# Patient Record
Sex: FEMALE | Race: BLACK OR AFRICAN AMERICAN | NOT HISPANIC OR LATINO | URBAN - METROPOLITAN AREA
[De-identification: names, ages, dates, MRNs, and addresses within clinical notes are randomized per-mention and may not be internally consistent; named-entity substitution may affect disease eponyms.]

---

## 2024-04-01 ENCOUNTER — HOSPITAL ENCOUNTER (EMERGENCY)
Facility: HOSPITAL | Age: 29
Discharge: HOME | End: 2024-04-01
Attending: EMERGENCY MEDICINE

## 2024-04-01 VITALS
SYSTOLIC BLOOD PRESSURE: 128 MMHG | HEIGHT: 62 IN | DIASTOLIC BLOOD PRESSURE: 75 MMHG | WEIGHT: 139 LBS | TEMPERATURE: 99.6 F | HEART RATE: 74 BPM | RESPIRATION RATE: 16 BRPM | BODY MASS INDEX: 25.58 KG/M2 | OXYGEN SATURATION: 99 %

## 2024-04-01 DIAGNOSIS — R10.2 PELVIC PAIN: Primary | ICD-10-CM

## 2024-04-01 DIAGNOSIS — N89.8 VAGINAL DISCHARGE: ICD-10-CM

## 2024-04-01 LAB
APPEARANCE UR: CLEAR
BACTERIA #/AREA URNS AUTO: ABNORMAL /HPF
BILIRUB UR STRIP.AUTO-MCNC: NEGATIVE MG/DL
CLUE CELLS SPEC QL WET PREP: NORMAL
COLOR UR: YELLOW
GLUCOSE UR STRIP.AUTO-MCNC: NORMAL MG/DL
KETONES UR STRIP.AUTO-MCNC: ABNORMAL MG/DL
LEUKOCYTE ESTERASE UR QL STRIP.AUTO: NEGATIVE
MUCOUS THREADS #/AREA URNS AUTO: ABNORMAL /LPF
NITRITE UR QL STRIP.AUTO: NEGATIVE
PH UR STRIP.AUTO: 5.5 [PH]
PREGNANCY TEST URINE, POC: NEGATIVE
PROT UR STRIP.AUTO-MCNC: ABNORMAL MG/DL
RBC # UR STRIP.AUTO: NEGATIVE /UL
RBC #/AREA URNS AUTO: ABNORMAL /HPF
SP GR UR STRIP.AUTO: 1.03
SQUAMOUS #/AREA URNS AUTO: ABNORMAL /HPF
T VAGINALIS SPEC QL WET PREP: NORMAL
UROBILINOGEN UR STRIP.AUTO-MCNC: NORMAL MG/DL
WBC #/AREA URNS AUTO: ABNORMAL /HPF
WBC VAG QL WET PREP: NORMAL
YEAST VAG QL WET PREP: NORMAL

## 2024-04-01 PROCEDURE — 81025 URINE PREGNANCY TEST: CPT

## 2024-04-01 PROCEDURE — 99284 EMERGENCY DEPT VISIT MOD MDM: CPT

## 2024-04-01 PROCEDURE — 99283 EMERGENCY DEPT VISIT LOW MDM: CPT

## 2024-04-01 PROCEDURE — 87210 SMEAR WET MOUNT SALINE/INK: CPT

## 2024-04-01 PROCEDURE — 87800 DETECT AGNT MULT DNA DIREC: CPT

## 2024-04-01 PROCEDURE — 2500000004 HC RX 250 GENERAL PHARMACY W/ HCPCS (ALT 636 FOR OP/ED)

## 2024-04-01 PROCEDURE — 81001 URINALYSIS AUTO W/SCOPE: CPT

## 2024-04-01 PROCEDURE — 96372 THER/PROPH/DIAG INJ SC/IM: CPT

## 2024-04-01 RX ORDER — KETOROLAC TROMETHAMINE 30 MG/ML
30 INJECTION, SOLUTION INTRAMUSCULAR; INTRAVENOUS ONCE
Status: COMPLETED | OUTPATIENT
Start: 2024-04-01 | End: 2024-04-01

## 2024-04-01 RX ORDER — IBUPROFEN 600 MG/1
600 TABLET ORAL EVERY 6 HOURS PRN
Qty: 28 TABLET | Refills: 0 | Status: SHIPPED | OUTPATIENT
Start: 2024-04-01 | End: 2024-04-08

## 2024-04-01 RX ORDER — ACETAMINOPHEN 500 MG
1000 TABLET ORAL EVERY 6 HOURS PRN
Qty: 30 TABLET | Refills: 0 | Status: SHIPPED | OUTPATIENT
Start: 2024-04-01 | End: 2024-04-11

## 2024-04-01 RX ADMIN — KETOROLAC TROMETHAMINE 30 MG: 30 INJECTION, SOLUTION INTRAMUSCULAR at 13:48

## 2024-04-01 ASSESSMENT — COLUMBIA-SUICIDE SEVERITY RATING SCALE - C-SSRS
1. IN THE PAST MONTH, HAVE YOU WISHED YOU WERE DEAD OR WISHED YOU COULD GO TO SLEEP AND NOT WAKE UP?: NO
2. HAVE YOU ACTUALLY HAD ANY THOUGHTS OF KILLING YOURSELF?: NO
6. HAVE YOU EVER DONE ANYTHING, STARTED TO DO ANYTHING, OR PREPARED TO DO ANYTHING TO END YOUR LIFE?: NO

## 2024-04-01 ASSESSMENT — PAIN SCALES - GENERAL: PAINLEVEL_OUTOF10: 8

## 2024-04-01 ASSESSMENT — PAIN - FUNCTIONAL ASSESSMENT: PAIN_FUNCTIONAL_ASSESSMENT: 0-10

## 2024-04-01 NOTE — ED TRIAGE NOTES
Patient had BV x5 months ago and was given antibiotics, patient is having lower pelvic pain and discharge. States that it feels similar to when she had BV the first time

## 2024-04-01 NOTE — ED PROVIDER NOTES
HPI   Chief Complaint   Patient presents with    Vaginal Discharge    Pelvic pain   This is an otherwise healthy 29-year-old female who presents to the ED with pelvic pain the past 3 days.  Patient states she has been having cramping pains in her pelvis, rated 8/10.  She also endorses some thin, nonpruritic whitish vaginal discharge.  Patient states that she was treated for bacterial vaginosis last year, states that her symptoms feel similar.  No concerns for STDs.   Denies any fevers, chills, chest pain, shortness of breath, abdominal pain, N/V/D or urinary symptoms.     Limitations to history: None  Independent Historians: None  External Records Reviewed: Prior ED notes  Patient History   No past medical history on file.  No past surgical history on file.  No family history on file.  Social History     Tobacco Use    Smoking status: Not on file    Smokeless tobacco: Not on file   Substance Use Topics    Alcohol use: Not on file    Drug use: Not on file       Physical Exam   ED Triage Vitals   Temperature Heart Rate Respirations BP   04/01/24 1127 04/01/24 1127 04/01/24 1127 04/01/24 1127   37.6 °C (99.6 °F) 78 16 114/69      Pulse Ox Temp src Heart Rate Source Patient Position   04/01/24 1128 -- -- --   99 %         BP Location FiO2 (%)     -- --             Physical Exam  Exam conducted with a chaperone present.   Constitutional:       General: She is not in acute distress.     Appearance: She is not ill-appearing or diaphoretic.   Cardiovascular:      Rate and Rhythm: Normal rate and regular rhythm.      Heart sounds: Normal heart sounds.   Pulmonary:      Effort: Pulmonary effort is normal. No respiratory distress.      Breath sounds: No wheezing, rhonchi or rales.   Abdominal:      General: Bowel sounds are normal.      Palpations: Abdomen is soft.      Tenderness: There is no abdominal tenderness. There is no right CVA tenderness, left CVA tenderness, guarding or rebound.   Genitourinary:     Pubic Area: No  rash.       Vagina: Normal.      Cervix: No cervical motion tenderness, friability, lesion, erythema or cervical bleeding.      Uterus: Not enlarged, not fixed and not tender.       Adnexa:         Right: No mass or tenderness.          Left: No mass or tenderness.        Comments: Thin whitish discharge was not in the vaginal vault, no bleeding  Skin:     General: Skin is warm and dry.   Neurological:      Mental Status: She is alert.         ED Course & MDM   ED Course as of 04/01/24 1347   Mon Apr 01, 2024   1334 POCT pregnancy, urine  Negative [LH]      ED Course User Index  [LH] Thea Bermeo PA-C         Diagnoses as of 04/01/24 1347   Pelvic pain   Vaginal discharge       Medical Decision Making  This is an otherwise healthy 29-year-old female who presents to the ED with pelvic pain the past 3 days.    Differentials considered but not limited to: STD, PID, UTI, pregnancy    On physical exam, there was a thin whitish discharge present in the vaginal vault, no bleeding. No cervical motion tenderness, friability, erythema or lesions.  The uterus was not enlarged fixed or tender. There were no palpable adnexal masses or tenderness to palpation.    Patient was able to tolerate speculum exam without difficulty.  There was no cervical motion tenderness.  Patient is afebrile and nontachycardic.  Wet prep was negative for clue cells, yeast or trichomonas.  All reassuring that this is not PID.     Patient was informed that she will be notified of any further positive STD results within the next few days.  Urinalysis was nonconcerning for UTI.    Administered Toradol for pain in the ED. On reassessment, patient states her pain has improved. She was provided prescriptions for Tylenol and Motrin for pain. Patient was informed of the results of her ED workup.  She was informed that she will be notified of any further positive STD results within the next few days.  Provided the contact information for the [facility] health  clinic to establish care with a GYN for follow-up.  Also provided the contact information for the [facility] to establish care with a PCP.  Return criteria was discussed.  Patient verbalized understanding and was agreeable with plan of care.  Discharged stable condition.       Thea Bermeo PA-C  04/01/24 1405

## 2024-04-02 LAB
C TRACH RRNA SPEC QL NAA+PROBE: NEGATIVE
HOLD SPECIMEN: NORMAL
N GONORRHOEA DNA SPEC QL PROBE+SIG AMP: NEGATIVE